# Patient Record
Sex: MALE | Race: WHITE | NOT HISPANIC OR LATINO | URBAN - NONMETROPOLITAN AREA
[De-identification: names, ages, dates, MRNs, and addresses within clinical notes are randomized per-mention and may not be internally consistent; named-entity substitution may affect disease eponyms.]

---

## 2024-10-30 ENCOUNTER — OFFICE VISIT (OUTPATIENT)
Dept: URBAN - NONMETROPOLITAN AREA CLINIC 2 | Facility: CLINIC | Age: 69
End: 2024-10-30
Payer: MEDICARE

## 2024-10-30 ENCOUNTER — DASHBOARD ENCOUNTERS (OUTPATIENT)
Age: 69
End: 2024-10-30

## 2024-10-30 VITALS
WEIGHT: 211 LBS | HEART RATE: 137 BPM | DIASTOLIC BLOOD PRESSURE: 78 MMHG | HEIGHT: 69 IN | BODY MASS INDEX: 31.25 KG/M2 | SYSTOLIC BLOOD PRESSURE: 127 MMHG

## 2024-10-30 DIAGNOSIS — R13.19 ESOPHAGEAL DYSPHAGIA: ICD-10-CM

## 2024-10-30 DIAGNOSIS — Z80.0 FAMILY HISTORY OF COLON CANCER REQUIRING SCREENING COLONOSCOPY: ICD-10-CM

## 2024-10-30 PROCEDURE — 99204 OFFICE O/P NEW MOD 45 MIN: CPT | Performed by: INTERNAL MEDICINE

## 2024-10-30 NOTE — HPI-TODAY'S VISIT:
Mr. Juventino Colunga is a pleasant 68 y/o M referred to GI clinic to discuss screening colonoscopy. He reports that he also has dysphagia symptoms.  He had an EGD years ago with dilation that improved his dysphagia however he had another EGD with no dilation performed does not look normal for 5 years ago.  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  He takes omeprazole on a daily basis for chronic heartburn.  He has been getting colonoscopies every 5 years or so as his father had colon cancer.  He recently moved back to [place].  His last colonoscopy was 5 years ago.

## 2024-12-31 ENCOUNTER — OFFICE VISIT (OUTPATIENT)
Dept: URBAN - NONMETROPOLITAN AREA SURGERY CENTER 1 | Facility: SURGERY CENTER | Age: 69
End: 2024-12-31

## 2025-02-10 ENCOUNTER — OFFICE VISIT (OUTPATIENT)
Dept: URBAN - NONMETROPOLITAN AREA CLINIC 2 | Facility: CLINIC | Age: 70
End: 2025-02-10
Payer: MEDICARE

## 2025-02-10 VITALS
BODY MASS INDEX: 31.31 KG/M2 | HEART RATE: 72 BPM | WEIGHT: 211.4 LBS | DIASTOLIC BLOOD PRESSURE: 75 MMHG | HEIGHT: 69 IN | SYSTOLIC BLOOD PRESSURE: 130 MMHG

## 2025-02-10 DIAGNOSIS — Z80.0 FAMILY HISTORY OF COLON CANCER REQUIRING SCREENING COLONOSCOPY: ICD-10-CM

## 2025-02-10 DIAGNOSIS — R13.19 ESOPHAGEAL DYSPHAGIA: ICD-10-CM

## 2025-02-10 PROCEDURE — 99214 OFFICE O/P EST MOD 30 MIN: CPT | Performed by: NURSE PRACTITIONER

## 2025-02-10 RX ORDER — LEVOTHYROXINE SODIUM 88 UG/1
TAKE ONE TABLET BY MOUTH EVERY MORNING TABLET ORAL
Qty: 90 UNSPECIFIED | Refills: 2 | Status: ACTIVE | COMMUNITY

## 2025-02-10 RX ORDER — OMEPRAZOLE 40 MG/1
1 CAPSULE 30 MINUTES BEFORE MORNING MEAL AND EVENING MEAL CAPSULE, DELAYED RELEASE ORAL TWICE DAILY
Qty: 60 | Refills: 5 | OUTPATIENT
Start: 2025-02-10

## 2025-02-10 RX ORDER — OMEPRAZOLE 40 MG/1
TAKE ONE CAPSULE BY MOUTH EVERY MORNING CAPSULE, DELAYED RELEASE ORAL
Qty: 90 UNSPECIFIED | Refills: 2 | Status: ACTIVE | COMMUNITY

## 2025-02-10 RX ORDER — TADALAFIL 20 MG/1
TAKE ONE TABLET BY MOUTH ONE TIME DAILY AS NEEDED FOR ERECTILE DYSFUNCTION TABLET ORAL
Qty: 30 UNSPECIFIED | Refills: 3 | Status: ACTIVE | COMMUNITY

## 2025-02-10 RX ORDER — ATORVASTATIN CALCIUM, FILM COATED 40 MG/1
TAKE ONE TABLET BY MOUTH EVERY MORNING TABLET ORAL
Qty: 90 UNSPECIFIED | Refills: 2 | Status: ACTIVE | COMMUNITY

## 2025-02-10 NOTE — HPI-OTHER HISTORIES
10/30/2024:  Mr. Juventino Colunga is a pleasant 68 y/o M referred to GI clinic to discuss screening colonoscopy. He reports that he also has dysphagia symptoms. He had an EGD years ago with dilation that improved his dysphagia however he had another EGD with no dilation performed does not look normal for 5 years ago. 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 He takes omeprazole on a daily basis for chronic heartburn. He has been getting colonoscopies every 5 years or so as his father had colon cancer. He recently moved back to [place]. His last colonoscopy was 5 years ago.

## 2025-02-10 NOTE — HPI-TODAY'S VISIT:
Juventino returns for follow-up of esophageal dysphagia.  At his last office visit a colonoscopy and EGD were ordered.  He underwent both procedures on December 31.  His colon showed no abnormality but because of his father's history of colorectal cancer repeat was advised in 5 years.  EGD revealed a low-grade narrowing of Schatzki's ring, subsequently dilated to 20 mm.  Pathology was negative for infection or inflammation.  A 3 cm hiatal hernia was also present.  Juventino reported some improvement in his symptoms for short period after the EGD but they are now back to baseline.  Defers modified barium swallow at this time. Encouraged thorough chewing and small bites. LG.